# Patient Record
Sex: FEMALE | Race: WHITE | NOT HISPANIC OR LATINO | URBAN - METROPOLITAN AREA
[De-identification: names, ages, dates, MRNs, and addresses within clinical notes are randomized per-mention and may not be internally consistent; named-entity substitution may affect disease eponyms.]

---

## 2022-02-25 ENCOUNTER — ESTABLISHED PATIENT (OUTPATIENT)
Dept: URBAN - METROPOLITAN AREA CLINIC 20 | Facility: CLINIC | Age: 82
End: 2022-02-25

## 2022-02-25 DIAGNOSIS — H26.493: ICD-10-CM

## 2022-02-25 DIAGNOSIS — H01.02A: ICD-10-CM

## 2022-02-25 DIAGNOSIS — H01.02B: ICD-10-CM

## 2022-02-25 DIAGNOSIS — H35.373: ICD-10-CM

## 2022-02-25 DIAGNOSIS — H02.88B: ICD-10-CM

## 2022-02-25 DIAGNOSIS — H02.88A: ICD-10-CM

## 2022-02-25 PROCEDURE — 99213 OFFICE O/P EST LOW 20 MIN: CPT

## 2022-02-25 RX ORDER — VALACYCLOVIR HYDROCHLORIDE 500 MG/1: TABLET ORAL ONCE A DAY

## 2022-02-25 ASSESSMENT — VISUAL ACUITY
OS_SC: 20/30
OU_SC: J5
OD_SC: 20/40

## 2022-02-25 ASSESSMENT — TONOMETRY
OS_IOP_MMHG: 19
OD_IOP_MMHG: 20

## 2022-02-25 NOTE — PATIENT DISCUSSION
Advised pt of findings. Use warm compresses OU BID for 5-10 minutes each time. Use AT OU QID. Use Ocusoft eyelid cleansers to cleanse lash line daily. and Hypochlor Spray.

## 2022-02-25 NOTE — PATIENT DISCUSSION
Continue: -* RETAINE 3 TIMES DAILY TO BOTH EYE -* OCUSOFT WIPES ONCE DAILY - * WARM COMPRESSES AND * LID MASSAGE X 10 MINUTES DAILY - * PATADAY 1 DROP IN BOTH EYES ONCE DAILY (CIPRO DRUG INTERACTION WITH PAZEO) -* OMEGA VITAMINS- 1 CAPSULE ONCE DAILY WITH FOOD. Modify: * REDUCE: RESTASIS TO ONE DROP IN EACH EYE DAILY INCREASE - * FML: REDUCE TO ONE DROP IN EACH EYE EVERY 3 DAYS.

## 2022-04-19 ENCOUNTER — ESTABLISHED PATIENT (OUTPATIENT)
Dept: URBAN - METROPOLITAN AREA CLINIC 20 | Facility: CLINIC | Age: 82
End: 2022-04-19

## 2022-04-19 DIAGNOSIS — H01.02A: ICD-10-CM

## 2022-04-19 DIAGNOSIS — H02.88A: ICD-10-CM

## 2022-04-19 DIAGNOSIS — H01.02B: ICD-10-CM

## 2022-04-19 DIAGNOSIS — H16.223: ICD-10-CM

## 2022-04-19 DIAGNOSIS — H02.88B: ICD-10-CM

## 2022-04-19 PROCEDURE — 68761 CLOSE TEAR DUCT OPENING: CPT

## 2022-04-19 PROCEDURE — 99213 OFFICE O/P EST LOW 20 MIN: CPT

## 2022-04-19 ASSESSMENT — TONOMETRY
OD_IOP_MMHG: 18
OS_IOP_MMHG: 19

## 2022-04-19 ASSESSMENT — VISUAL ACUITY
OS_SC: 20/30
OD_SC: 20/50

## 2022-04-19 NOTE — PATIENT DISCUSSION
Continue: -* RETAINE BID OU --- * OCUSOFT WIPES ONCE DAILY -- * WARM COMPRESSES AND -- * LID MASSAGE X 10 MINUTES DAILY -- * OMEGA VITAMINS- 1 CAPSULE ONCE DAILY WITH FOOD. home

## 2022-04-19 NOTE — PROCEDURE NOTE: CLINICAL
PROCEDURE NOTE: Punctal Plugs, Extended Bilateral Lower Lids. Diagnosis: Keratoconjunctivitis Sicca, Not Specified As Sjögren's. Prior to treatment, the risks/benefits/alternatives were discussed. The patient wished to proceed with procedure. Extended duration plugs were inserted. Brand: Bruce Ravi. Size: 0.3mm Location: RLL/LLL punctum. Patient tolerated procedure well. There were no complications. Post procedure instructions given. Jessie Barajas

## 2022-05-31 ENCOUNTER — FOLLOW UP (OUTPATIENT)
Dept: URBAN - METROPOLITAN AREA CLINIC 20 | Facility: CLINIC | Age: 82
End: 2022-05-31

## 2022-05-31 DIAGNOSIS — H02.88A: ICD-10-CM

## 2022-05-31 DIAGNOSIS — Z96.1: ICD-10-CM

## 2022-05-31 DIAGNOSIS — H02.88B: ICD-10-CM

## 2022-05-31 DIAGNOSIS — H16.223: ICD-10-CM

## 2022-05-31 DIAGNOSIS — H01.02B: ICD-10-CM

## 2022-05-31 DIAGNOSIS — H01.02A: ICD-10-CM

## 2022-05-31 PROCEDURE — 99213 OFFICE O/P EST LOW 20 MIN: CPT

## 2022-05-31 ASSESSMENT — TONOMETRY
OS_IOP_MMHG: 16
OD_IOP_MMHG: 17

## 2022-05-31 ASSESSMENT — VISUAL ACUITY
OS_SC: 20/40+2
OD_SC: 20/40
OU_CC: J1

## 2022-06-14 ENCOUNTER — FOLLOW UP (OUTPATIENT)
Dept: URBAN - METROPOLITAN AREA CLINIC 20 | Facility: CLINIC | Age: 82
End: 2022-06-14

## 2022-06-14 DIAGNOSIS — H01.02A: ICD-10-CM

## 2022-06-14 DIAGNOSIS — Z96.1: ICD-10-CM

## 2022-06-14 DIAGNOSIS — H16.223: ICD-10-CM

## 2022-06-14 DIAGNOSIS — H02.88A: ICD-10-CM

## 2022-06-14 DIAGNOSIS — H01.02B: ICD-10-CM

## 2022-06-14 DIAGNOSIS — H02.88B: ICD-10-CM

## 2022-06-14 PROCEDURE — 99213 OFFICE O/P EST LOW 20 MIN: CPT

## 2022-06-14 ASSESSMENT — TONOMETRY
OS_IOP_MMHG: 18
OD_IOP_MMHG: 19

## 2022-06-14 ASSESSMENT — VISUAL ACUITY
OS_SC: 20/30-1
OD_SC: 20/40-2

## 2022-06-14 NOTE — PATIENT DISCUSSION
CONTINUE: FML QD OU X 1 WEEK THEN DECREASE TO QD QOD OU THEN IF PT IS DOING WELL WILL DECREASE TO 3 X A WEEK. Judi Zepeda

## 2022-07-05 ENCOUNTER — FOLLOW UP (OUTPATIENT)
Dept: URBAN - METROPOLITAN AREA CLINIC 20 | Facility: CLINIC | Age: 82
End: 2022-07-05

## 2022-07-05 DIAGNOSIS — H52.4: ICD-10-CM

## 2022-07-05 PROCEDURE — 92015 DETERMINE REFRACTIVE STATE: CPT

## 2022-07-05 PROCEDURE — 99211NC NO CHARGE VISIT

## 2022-07-05 ASSESSMENT — VISUAL ACUITY
OU_SC: 20/30
OS_SC: 20/40
OD_SC: 20/50
OS_PH: 20/40
OD_PH: 20/40

## 2022-07-05 ASSESSMENT — KERATOMETRY
OS_AXISANGLE2_DEGREES: 48
OS_K1POWER_DIOPTERS: 43.25
OD_AXISANGLE_DEGREES: 0
OD_K1POWER_DIOPTERS: 43.50
OS_AXISANGLE_DEGREES: 138
OD_AXISANGLE2_DEGREES: 90
OS_K2POWER_DIOPTERS: 43.50
OD_K2POWER_DIOPTERS: 43.50

## 2022-07-05 ASSESSMENT — TONOMETRY
OS_IOP_MMHG: 16
OD_IOP_MMHG: 16

## 2022-07-05 NOTE — PATIENT DISCUSSION
CONTINUE: FML QD OU X 1 WEEK THEN DECREASE TO QD QOD OU THEN IF PT IS DOING WELL WILL DECREASE TO 3 X A WEEK. Lalo Jin

## 2022-07-05 NOTE — PATIENT DISCUSSION
CONTINUE: RESTASIS BID OU. Spoke with Rachana Christensen with [address] [facility],[address] and she stated patient notified her that she has 15 doses left at home (enough to last until 12/13/20). Patient has confirmed she has missed doses throughout therapy. Routed to Dr. Janny Nava - Please advise if you would like patient to finish the 15 doses she has left and then start the oral antibiotics or would you like patient to start oral antibiotics now? Thank you.

## 2022-11-29 ENCOUNTER — FOLLOW UP (OUTPATIENT)
Dept: URBAN - METROPOLITAN AREA CLINIC 20 | Facility: CLINIC | Age: 82
End: 2022-11-29

## 2022-11-29 DIAGNOSIS — H02.88A: ICD-10-CM

## 2022-11-29 DIAGNOSIS — H16.223: ICD-10-CM

## 2022-11-29 DIAGNOSIS — H02.88B: ICD-10-CM

## 2022-11-29 DIAGNOSIS — H01.02A: ICD-10-CM

## 2022-11-29 DIAGNOSIS — H01.02B: ICD-10-CM

## 2022-11-29 PROCEDURE — 92285 EXTERNAL OCULAR PHOTOGRAPHY: CPT

## 2022-11-29 PROCEDURE — 99214 OFFICE O/P EST MOD 30 MIN: CPT

## 2022-11-29 PROCEDURE — 68761 CLOSE TEAR DUCT OPENING: CPT

## 2022-11-29 ASSESSMENT — VISUAL ACUITY
OS_SC: 20/50+1
OD_SC: 20/40+1

## 2022-11-29 ASSESSMENT — TONOMETRY
OS_IOP_MMHG: 18
OD_IOP_MMHG: 18

## 2022-11-29 NOTE — PATIENT DISCUSSION
* CONTINUE: FML QD OU X 1 WEEK THEN DECREASE TO QD QOD OU THEN IF PT IS DOING WELL WILL DECREASE TO 3 X A WEEK. Rafal Forde

## 2022-11-29 NOTE — PROCEDURE NOTE: CLINICAL
PROCEDURE NOTE: Punctal Plugs, Extended OU. Diagnosis: Keratoconjunctivitis Sicca, Not Specified As Sjögren's. Prior to treatment, the risks/benefits/alternatives were discussed. The patient wished to proceed with procedure. Extended duration plugs were inserted. Brand: Marii Mcnair. Size: 0.3 mm Location: RLL/LLL punctum. Patient tolerated procedure well. There were no complications. Post procedure instructions given. Leesa Pulliam

## 2022-11-29 NOTE — PATIENT DISCUSSION
* Punctal plugs inserted into both lower eyelids. Patient instructed to call if any irritation or redness.